# Patient Record
Sex: FEMALE
[De-identification: names, ages, dates, MRNs, and addresses within clinical notes are randomized per-mention and may not be internally consistent; named-entity substitution may affect disease eponyms.]

---

## 2023-12-29 ENCOUNTER — DASHBOARD ENCOUNTERS (OUTPATIENT)
Age: 23
End: 2023-12-29

## 2024-01-04 ENCOUNTER — OFFICE VISIT (OUTPATIENT)
Dept: URBAN - METROPOLITAN AREA CLINIC 82 | Facility: CLINIC | Age: 24
End: 2024-01-04

## 2024-06-25 ENCOUNTER — LAB OUTSIDE AN ENCOUNTER (OUTPATIENT)
Dept: URBAN - METROPOLITAN AREA CLINIC 82 | Facility: CLINIC | Age: 24
End: 2024-06-25

## 2024-06-25 ENCOUNTER — OFFICE VISIT (OUTPATIENT)
Dept: URBAN - METROPOLITAN AREA CLINIC 82 | Facility: CLINIC | Age: 24
End: 2024-06-25
Payer: COMMERCIAL

## 2024-06-25 VITALS
HEIGHT: 71 IN | HEART RATE: 85 BPM | BODY MASS INDEX: 37.66 KG/M2 | WEIGHT: 269 LBS | SYSTOLIC BLOOD PRESSURE: 136 MMHG | TEMPERATURE: 98 F | DIASTOLIC BLOOD PRESSURE: 82 MMHG

## 2024-06-25 DIAGNOSIS — K58.8 OTHER IRRITABLE BOWEL SYNDROME: ICD-10-CM

## 2024-06-25 DIAGNOSIS — R10.13 DYSPEPSIA: ICD-10-CM

## 2024-06-25 DIAGNOSIS — K62.5 RECTAL BLEEDING: ICD-10-CM

## 2024-06-25 DIAGNOSIS — R10.31 RLQ CRAMPING: ICD-10-CM

## 2024-06-25 PROBLEM — 301754002: Status: ACTIVE | Noted: 2024-06-25

## 2024-06-25 PROBLEM — 10743008: Status: ACTIVE | Noted: 2024-06-25

## 2024-06-25 PROCEDURE — 99204 OFFICE O/P NEW MOD 45 MIN: CPT | Performed by: INTERNAL MEDICINE

## 2024-06-25 PROCEDURE — 99244 OFF/OP CNSLTJ NEW/EST MOD 40: CPT | Performed by: INTERNAL MEDICINE

## 2024-06-25 RX ORDER — HYOSCYAMINE SULFATE 0.12 MG/1
1 TABLET UNDER THE TONGUE AND ALLOW TO DISSOLVE  AS NEEDED TABLET SUBLINGUAL THREE TIMES A DAY
Qty: 60 TABLET | Refills: 1 | OUTPATIENT
Start: 2024-06-25 | End: 2024-08-24

## 2024-06-25 RX ORDER — FAMOTIDINE 40 MG/1
1 TABLET AT BEDTIME TABLET, FILM COATED ORAL ONCE A DAY
Qty: 90 TABLET | Refills: 1 | OUTPATIENT
Start: 2024-06-25

## 2024-06-25 NOTE — HPI-TODAY'S VISIT:
23-year-old young female came into the office for the evaluation of significant abdominal bloating pain discomfort been going on for several years.  Patient stated she has seen gynecology and on several interrogations but this lower abdominal pain and cramping which improves sometimes after having a bowel movement and sometimes on its own.  Patient stated she has been having IUD for 5 years not changing her medications however her pain comes at random.  Patient was advised to change her eating habits however she did not change her eating habits for the past month and have her symptoms are under control.  Patient was concerned about having any colonic issues since her gynecologist have recommended GI workup.  Patient denies any family history of congenital bowel disease.  Patient reports having history of complex PTSD as well as anxiety and depression.  Patient stated she is not able to manage her eating habits and she does do extra stress eating.  Patient reports having history of gallstones family history of gallstones and reports having cholecystectomy done year ago.  She reports she does reports having worsening of her GI symptoms of abdominal pain and cramping when she eats certain foods.  Denies any diarrhea reports she reports having rectal bleeding on few occasions.  Other complaints also includes intermittent epigastric discomfort after eating food and indigestion she uses over-the-counter medications with that she also reports having epigastric fullness denies use of NSAIDs on a frequent basis.  She denies melanotic stools.  Reports having labs in February within normal limits.

## 2024-06-25 NOTE — HPI-TODAY'S VISIT:
This patient was referred by.Betzaida Garcia  for evaluation of abdominal cramping. The copy of this note will be sent to the referring provider.

## 2024-08-01 ENCOUNTER — CLAIMS CREATED FROM THE CLAIM WINDOW (OUTPATIENT)
Dept: URBAN - METROPOLITAN AREA SURGERY CENTER 13 | Facility: SURGERY CENTER | Age: 24
End: 2024-08-01
Payer: COMMERCIAL

## 2024-08-01 ENCOUNTER — CLAIMS CREATED FROM THE CLAIM WINDOW (OUTPATIENT)
Dept: URBAN - METROPOLITAN AREA CLINIC 4 | Facility: CLINIC | Age: 24
End: 2024-08-01
Payer: COMMERCIAL

## 2024-08-01 ENCOUNTER — TELEPHONE ENCOUNTER (OUTPATIENT)
Dept: URBAN - METROPOLITAN AREA CLINIC 82 | Facility: CLINIC | Age: 24
End: 2024-08-01

## 2024-08-01 DIAGNOSIS — K21.9 GASTRO-ESOPHAGEAL REFLUX DISEASE WITHOUT ESOPHAGITIS: ICD-10-CM

## 2024-08-01 DIAGNOSIS — R10.13 DYSPEPSIA: ICD-10-CM

## 2024-08-01 DIAGNOSIS — K31.89 OTHER DISEASES OF STOMACH AND DUODENUM: ICD-10-CM

## 2024-08-01 DIAGNOSIS — K21.9 GASTROESOPHAGEAL REFLUX DISEASE: ICD-10-CM

## 2024-08-01 DIAGNOSIS — K92.1 HEMATOCHEZIA: ICD-10-CM

## 2024-08-01 DIAGNOSIS — K64.8 OTHER HEMORRHOIDS: ICD-10-CM

## 2024-08-01 PROCEDURE — 00813 ANES UPR LWR GI NDSC PX: CPT | Performed by: ANESTHESIOLOGIST ASSISTANT

## 2024-08-01 PROCEDURE — 45378 DIAGNOSTIC COLONOSCOPY: CPT | Performed by: INTERNAL MEDICINE

## 2024-08-01 PROCEDURE — 43239 EGD BIOPSY SINGLE/MULTIPLE: CPT | Performed by: INTERNAL MEDICINE

## 2024-08-01 PROCEDURE — 88312 SPECIAL STAINS GROUP 1: CPT | Performed by: PATHOLOGY

## 2024-08-01 PROCEDURE — 00813 ANES UPR LWR GI NDSC PX: CPT | Performed by: ANESTHESIOLOGY

## 2024-08-01 PROCEDURE — 88305 TISSUE EXAM BY PATHOLOGIST: CPT | Performed by: PATHOLOGY

## 2024-08-01 RX ORDER — PANTOPRAZOLE SODIUM 40 MG/1
1 TABLET TABLET, DELAYED RELEASE ORAL ONCE A DAY
Qty: 30 TABLET | Refills: 1 | OUTPATIENT
Start: 2024-08-01

## 2024-08-01 RX ORDER — FAMOTIDINE 40 MG/1
1 TABLET AT BEDTIME TABLET, FILM COATED ORAL ONCE A DAY
Qty: 90 TABLET | Refills: 1 | Status: ACTIVE | COMMUNITY
Start: 2024-06-25

## 2024-08-01 RX ORDER — HYOSCYAMINE SULFATE 0.12 MG/1
1 TABLET UNDER THE TONGUE AND ALLOW TO DISSOLVE  AS NEEDED TABLET SUBLINGUAL THREE TIMES A DAY
Qty: 60 TABLET | Refills: 1 | Status: ACTIVE | COMMUNITY
Start: 2024-06-25 | End: 2024-08-24

## 2024-09-02 ENCOUNTER — ERX REFILL RESPONSE (OUTPATIENT)
Dept: URBAN - METROPOLITAN AREA CLINIC 82 | Facility: CLINIC | Age: 24
End: 2024-09-02

## 2024-09-02 RX ORDER — PANTOPRAZOLE SODIUM 40 MG/1
1 TABLET TABLET, DELAYED RELEASE ORAL ONCE A DAY
Qty: 30 TABLET | Refills: 1 | OUTPATIENT

## 2024-09-02 RX ORDER — PANTOPRAZOLE SODIUM 40 MG/1
TAKE 1 TABLET BY MOUTH EVERY DAY FOR 30 DAYS TABLET, DELAYED RELEASE ORAL
Qty: 90 TABLET | Refills: 1 | OUTPATIENT

## 2025-04-01 ENCOUNTER — ERX REFILL RESPONSE (OUTPATIENT)
Dept: URBAN - METROPOLITAN AREA CLINIC 82 | Facility: CLINIC | Age: 25
End: 2025-04-01

## 2025-04-01 RX ORDER — PANTOPRAZOLE SODIUM 40 MG/1
TAKE 1 TABLET BY MOUTH EVERY DAY TABLET, DELAYED RELEASE ORAL
Qty: 90 TABLET | Refills: 1 | OUTPATIENT

## 2025-04-01 RX ORDER — PANTOPRAZOLE SODIUM 40 MG/1
TAKE 1 TABLET BY MOUTH EVERY DAY FOR 30 DAYS TABLET, DELAYED RELEASE ORAL
Qty: 90 TABLET | Refills: 1 | OUTPATIENT